# Patient Record
Sex: FEMALE | Race: WHITE | NOT HISPANIC OR LATINO
[De-identification: names, ages, dates, MRNs, and addresses within clinical notes are randomized per-mention and may not be internally consistent; named-entity substitution may affect disease eponyms.]

---

## 2022-01-31 ENCOUNTER — APPOINTMENT (OUTPATIENT)
Dept: INTERNAL MEDICINE | Facility: CLINIC | Age: 57
End: 2022-01-31
Payer: COMMERCIAL

## 2022-01-31 VITALS
WEIGHT: 192 LBS | HEART RATE: 105 BPM | RESPIRATION RATE: 17 BRPM | SYSTOLIC BLOOD PRESSURE: 130 MMHG | HEIGHT: 63 IN | TEMPERATURE: 96.4 F | DIASTOLIC BLOOD PRESSURE: 90 MMHG | OXYGEN SATURATION: 94 % | BODY MASS INDEX: 34.02 KG/M2

## 2022-01-31 PROCEDURE — 99214 OFFICE O/P EST MOD 30 MIN: CPT

## 2022-01-31 RX ORDER — CYCLOBENZAPRINE HYDROCHLORIDE 10 MG/1
10 TABLET, FILM COATED ORAL
Qty: 30 | Refills: 0 | Status: ACTIVE | COMMUNITY
Start: 2022-01-31 | End: 1900-01-01

## 2022-01-31 RX ORDER — LIDOCAINE 5% 700 MG/1
5 PATCH TOPICAL
Qty: 7 | Refills: 1 | Status: ACTIVE | COMMUNITY
Start: 2022-01-31 | End: 1900-01-01

## 2022-02-01 ENCOUNTER — TRANSCRIPTION ENCOUNTER (OUTPATIENT)
Age: 57
End: 2022-02-01

## 2022-02-01 NOTE — HISTORY OF PRESENT ILLNESS
[FreeTextEntry1] : cc/itchy rash chest wall, lbp with spasm and pain [de-identified] : Patient noted a rash under her breasts and upper abdomen which is itchy in nature times several days.  Patient states that cortisone cream made it worse irritated the rash.  Patient also has low back pain with spasm and decreased range of motion difficult to sit.

## 2022-02-01 NOTE — HEALTH RISK ASSESSMENT
[Fair] :  ~his/her~ mood as fair [Former] : Former [No falls in past year] : Patient reported no falls in the past year [None] : None [With Significant Other] : lives with significant other [Employed] : employed [High School] : high school [] :  [Fully functional (bathing, dressing, toileting, transferring, walking, feeding)] : Fully functional (bathing, dressing, toileting, transferring, walking, feeding) [Fully functional (using the telephone, shopping, preparing meals, housekeeping, doing laundry, using] : Fully functional and needs no help or supervision to perform IADLs (using the telephone, shopping, preparing meals, housekeeping, doing laundry, using transportation, managing medications and managing finances) [Change in mental status noted] : No change in mental status noted [Language] : denies difficulty with language [Behavior] : denies difficulty with behavior [Learning/Retaining New Information] : denies difficulty learning/retaining new information [Handling Complex Tasks] : denies difficulty handling complex tasks [Reasoning] : denies difficulty with reasoning [Spatial Ability and Orientation] : denies difficulty with spatial ability and orientation [MammogramComments] : Not up-to-date [PapSmearComments] : Not up-to-date [BoneDensityComments] : Not up-to-date [ColonoscopyComments] : Not up-to-date

## 2022-02-01 NOTE — REVIEW OF SYSTEMS
[Back Pain] : back pain [Skin Rash] : skin rash [Negative] : Neurological [de-identified] : Chest wall and under breasts erythematous rash

## 2022-02-02 ENCOUNTER — NON-APPOINTMENT (OUTPATIENT)
Age: 57
End: 2022-02-02

## 2022-02-08 RX ORDER — METHYLPREDNISOLONE 4 MG/1
4 TABLET ORAL
Qty: 1 | Refills: 0 | Status: ACTIVE | COMMUNITY
Start: 2022-02-08 | End: 1900-01-01

## 2022-02-18 DIAGNOSIS — J32.9 CHRONIC SINUSITIS, UNSPECIFIED: ICD-10-CM

## 2022-02-18 DIAGNOSIS — N39.0 URINARY TRACT INFECTION, SITE NOT SPECIFIED: ICD-10-CM

## 2022-02-18 DIAGNOSIS — F17.200 NICOTINE DEPENDENCE, UNSPECIFIED, UNCOMPLICATED: ICD-10-CM

## 2022-02-18 DIAGNOSIS — R05.9 COUGH, UNSPECIFIED: ICD-10-CM

## 2022-02-18 DIAGNOSIS — M25.579 PAIN IN UNSPECIFIED ANKLE AND JOINTS OF UNSPECIFIED FOOT: ICD-10-CM

## 2022-02-18 DIAGNOSIS — J06.9 ACUTE UPPER RESPIRATORY INFECTION, UNSPECIFIED: ICD-10-CM

## 2022-02-18 DIAGNOSIS — Q07.00 ARNOLD-CHIARI SYNDROME W/OUT SPINA BIFIDA OR HYDROCEPHALUS: ICD-10-CM

## 2022-02-19 ENCOUNTER — LABORATORY RESULT (OUTPATIENT)
Age: 57
End: 2022-02-19

## 2022-02-22 ENCOUNTER — NON-APPOINTMENT (OUTPATIENT)
Age: 57
End: 2022-02-22

## 2022-02-23 ENCOUNTER — APPOINTMENT (OUTPATIENT)
Dept: INTERNAL MEDICINE | Facility: CLINIC | Age: 57
End: 2022-02-23
Payer: COMMERCIAL

## 2022-02-23 ENCOUNTER — NON-APPOINTMENT (OUTPATIENT)
Age: 57
End: 2022-02-23

## 2022-02-23 VITALS
SYSTOLIC BLOOD PRESSURE: 136 MMHG | WEIGHT: 195 LBS | HEART RATE: 92 BPM | OXYGEN SATURATION: 99 % | DIASTOLIC BLOOD PRESSURE: 90 MMHG | TEMPERATURE: 99.7 F | RESPIRATION RATE: 17 BRPM | HEIGHT: 63 IN | BODY MASS INDEX: 34.55 KG/M2

## 2022-02-23 DIAGNOSIS — D75.1 SECONDARY POLYCYTHEMIA: ICD-10-CM

## 2022-02-23 DIAGNOSIS — M54.50 LOW BACK PAIN, UNSPECIFIED: ICD-10-CM

## 2022-02-23 DIAGNOSIS — Z86.39 PERSONAL HISTORY OF OTHER ENDOCRINE, NUTRITIONAL AND METABOLIC DISEASE: ICD-10-CM

## 2022-02-23 PROCEDURE — 93000 ELECTROCARDIOGRAM COMPLETE: CPT

## 2022-02-23 PROCEDURE — 99396 PREV VISIT EST AGE 40-64: CPT | Mod: 25

## 2022-02-23 PROCEDURE — 99214 OFFICE O/P EST MOD 30 MIN: CPT | Mod: 25

## 2022-02-23 PROCEDURE — 99406 BEHAV CHNG SMOKING 3-10 MIN: CPT | Mod: NC

## 2022-02-24 PROBLEM — D75.1 POLYCYTHEMIA: Status: ACTIVE | Noted: 2022-02-24

## 2022-02-24 PROBLEM — M54.50 LOW BACK PAIN: Status: ACTIVE | Noted: 2022-02-01

## 2022-02-24 PROBLEM — Z86.39 HISTORY OF HYPERLIPIDEMIA: Status: ACTIVE | Noted: 2022-02-01

## 2022-02-24 NOTE — PHYSICAL EXAM
[Declined Breast Exam] : declined breast exam  [Declined Rectal Exam] : declined rectal exam [Scoliosis] : scoliosis [Coordination Grossly Intact] : coordination grossly intact [Normal Gait] : normal gait [Deep Tendon Reflexes (DTR)] : deep tendon reflexes were 2+ and symmetric [Normal] : affect was normal and insight and judgment were intact [Kyphosis] : no kyphosis [de-identified] : Obesity [de-identified] : No CVA tenderness [de-identified] : Low back spasm, decreased range of motion tenderness to deep palpation [de-identified] : Left  straight leg raise positive

## 2022-02-24 NOTE — COUNSELING
[Fall prevention counseling provided] : Fall prevention counseling provided [Adequate lighting] : Adequate lighting [No throw rugs] : No throw rugs [Use proper foot wear] : Use proper foot wear [Use recommended devices] : Use recommended devices [Behavioral health counseling provided] : Behavioral health counseling provided [Sleep ___ hours/day] : Sleep [unfilled] hours/day [Engage in a relaxing activity] : Engage in a relaxing activity [Plan in advance] : Plan in advance [Cessation strategies including cessation program discussed] : Cessation strategies including cessation program discussed [Use of nicotine replacement therapies and other medications discussed] : Use of nicotine replacement therapies and other medications discussed [Encouraged to pick a quit date and identify support needed to quit] : Encouraged to pick a quit date and identify support needed to quit [Smoking Cessation Program Referral] : Smoking Cessation Program Referral  [Potential consequences of obesity discussed] : Potential consequences of obesity discussed [Benefits of weight loss discussed] : Benefits of weight loss discussed [Structured Weight Management Program suggested:] : Structured weight management program suggested [Encouraged to maintain food diary] : Encouraged to maintain food diary [Encouraged to increase physical activity] : Encouraged to increase physical activity [Encouraged to use exercise tracking device] : Encouraged to use exercise tracking device [Target Wt Loss Goal ___] : Weight Loss Goals: Target weight loss goal [unfilled] lbs [Weigh Self Weekly] : weigh self weekly [Decrease Portions] : decrease portions [____ min/wk Activity] : [unfilled] min/wk activity [Keep Food Diary] : keep food diary [None] : None [Good understanding] : Patient has a good understanding of lifestyle changes and steps needed to achieve self management goal [FreeTextEntry1] : 3 [de-identified] : Medical Annual wellness visit completed:\par HRA completed and reviewed with patient\par Medical, family, surgical history reviewed with patient and updated\par List of current providers r/w patient and updated\par Vitals, BMI reviewed and discussed along with healthy BMI goals. Dietary counseling x 15 minutes provided\par Depression PHQ 9 completed and reviewed \par Annual safety assessment reviewed\par discussed advanced directives\par smoking cessation counseling provided\par Established routine screening and immunization schedules\par Medical Annual wellness visit completed:\par HRA completed and reviewed with patient\par Medical, family, surgical history reviewed with patient and updated\par List of current providers r/w patient and updated\par Vitals, BMI reviewed and discussed along with healthy BMI goals. Dietary counseling x 15 minutes provided\par Depression PHQ 9 completed and reviewed \par Annual safety assessment reviewed\par discussed advanced directives\par smoking cessation counseling provided\par Established routine screening and immunization schedules\par \par VACCINATION & OTHER TX RECOMMENDATIONS\par \par ASA preventative therapy\par Calcium/Vitamin D supplementation\par  \par Dietary counseling, nutrition referral\par risks vs. benefits d/w patient. routine vaccination and vaccination schedules and recommendation d/w patient\par \par Vaccines recommended: \par * pneumovax (once after 65) & prevnar\par * annual Influenza vaccine\par * Hep B vaccines\par * zostavax\par * Tdap\par \par Colorectal screening recommended; screening colonoscopy q10yr, flex sig q5yr, annual fecal occult testing\par BMD recommended biennially for osteoporosis screening\par Glaucoma screening recommended, annual optho evals\par Cardiovascular screening and blood tests recommended and discussed w/ patient, cholesterol screening and dietary counseling\par AAA recommended x 1\par \par DIABETIC recommendations:\par med nutrition counseling, nutrition referral \par annual podiatry evaluations and follow up\par annual optho eval/retinal exams\par routine blood tets, including FBS, a1c% and cholesterol panels\par \par Symptomatic patients : Test for influenza, if positive, treat for influenza and do not continue below. \par 1. Fever plus cough or shortness of breath : Test for RVP and COVID-19.\par 2.Indirect, circumstantial or unclear exposure to COVID-19, or other concerning cases not meeting above criteria: Please call [facility] to discuss testing. \par +++ All above cases must be reported to the [facility] registry. +++\par \par Asymptomatic patients: \par 1. Known first-degree direct-contact exposure to positive COVID-19 patient but asymptomatic: No testing PLUS 14 day self-quarantine. Pt to call if symptoms develop. Report to [facility] Registry.\par 2. No known exposure and asymptomatic, referred from outside healthcare organization: Please call [facility] to discuss testing. \par 3.All other asymptomatic patients with no known exposures: no testing, no exceptions.\par \par

## 2022-02-24 NOTE — HISTORY OF PRESENT ILLNESS
[FreeTextEntry1] : 56-year-old female who was seen last month for severe low back pain radiating into lower extremities, rash on chest wall and abdomen. [de-identified] : Patient continues to have low back pain has been on Medrol Dosepak and Flexeril.  Has yet to see physiatry.  Patient admits that pain is constant and lower back and radiates down into buttocks and lower extremities.  Patient states that rash on chest and abdomen has resolved.  Patient had blood work which reveals abnormalities.

## 2022-02-24 NOTE — HEALTH RISK ASSESSMENT
[Fair] :  ~his/her~ mood as fair [Current] : Current [15-19] : 15-19 [Yes] : Yes [1 or 2 (0 pts)] : 1 or 2 (0 points) [Never (0 pts)] : Never (0 points) [No falls in past year] : Patient reported no falls in the past year [0] : 2) Feeling down, depressed, or hopeless: Not at all (0) [PHQ-2 Negative - No further assessment needed] : PHQ-2 Negative - No further assessment needed [None] : None [With Significant Other] : lives with significant other [Employed] : employed [High School] : high school [] :  [Feels Safe at Home] : Feels safe at home [Fully functional (bathing, dressing, toileting, transferring, walking, feeding)] : Fully functional (bathing, dressing, toileting, transferring, walking, feeding) [Fully functional (using the telephone, shopping, preparing meals, housekeeping, doing laundry, using] : Fully functional and needs no help or supervision to perform IADLs (using the telephone, shopping, preparing meals, housekeeping, doing laundry, using transportation, managing medications and managing finances) [Smoke Detector] : smoke detector [Carbon Monoxide Detector] : carbon monoxide detector [Seat Belt] :  uses seat belt [Designated Healthcare Proxy] : Designated healthcare proxy [Name: ___] : Health Care Proxy's Name: [unfilled]  [Relationship: ___] : Relationship: [unfilled] [Aggressive treatment] : aggressive treatment [I will adhere to the patient's wishes.] : I will adhere to the patient's wishes. [de-identified] : Light [de-identified] : Regular [Language] : denies difficulty with language [Behavior] : denies difficulty with behavior [Learning/Retaining New Information] : denies difficulty learning/retaining new information [Reasoning] : denies difficulty with reasoning [Reports changes in hearing] : Reports no changes in hearing [Reports changes in vision] : Reports no changes in vision [Reports changes in dental health] : Reports no changes in dental health [Travel to Developing Areas] : does not  travel to developing areas [Caregiver Concerns] : does not have caregiver concerns [MammogramComments] : Ordered [PapSmearComments] : Recommended [BoneDensityComments] : Ordered

## 2022-02-24 NOTE — REVIEW OF SYSTEMS
[Skin Rash] : skin rash [Negative] : Heme/Lymph [FreeTextEntry2] : Weight gain excessive appetite since pandemic [FreeTextEntry9] : Low back pain [de-identified] : Abdomen chest resolved

## 2022-02-24 NOTE — REASON FOR VISIT
[Annual Wellness Visit] : an annual wellness visit [FreeTextEntry1] : Annual wellness exam for this 56-year-old female

## 2022-04-16 DIAGNOSIS — Z00.00 ENCOUNTER FOR GENERAL ADULT MEDICAL EXAMINATION W/OUT ABNORMAL FINDINGS: ICD-10-CM

## 2022-04-22 ENCOUNTER — LABORATORY RESULT (OUTPATIENT)
Age: 57
End: 2022-04-22

## 2022-04-27 ENCOUNTER — NON-APPOINTMENT (OUTPATIENT)
Age: 57
End: 2022-04-27

## 2022-05-04 ENCOUNTER — APPOINTMENT (OUTPATIENT)
Dept: INTERNAL MEDICINE | Facility: CLINIC | Age: 57
End: 2022-05-04
Payer: COMMERCIAL

## 2022-05-04 VITALS
OXYGEN SATURATION: 95 % | RESPIRATION RATE: 18 BRPM | BODY MASS INDEX: 34.38 KG/M2 | HEIGHT: 63 IN | TEMPERATURE: 97.3 F | WEIGHT: 194 LBS | DIASTOLIC BLOOD PRESSURE: 88 MMHG | SYSTOLIC BLOOD PRESSURE: 152 MMHG | HEART RATE: 88 BPM

## 2022-05-04 DIAGNOSIS — E11.9 TYPE 2 DIABETES MELLITUS W/OUT COMPLICATIONS: ICD-10-CM

## 2022-05-04 DIAGNOSIS — F17.210 NICOTINE DEPENDENCE, CIGARETTES, UNCOMPLICATED: ICD-10-CM

## 2022-05-04 DIAGNOSIS — R03.0 ELEVATED BLOOD-PRESSURE READING, W/OUT DIAGNOSIS OF HYPERTENSION: ICD-10-CM

## 2022-05-04 DIAGNOSIS — B35.4 TINEA CORPORIS: ICD-10-CM

## 2022-05-04 DIAGNOSIS — E66.9 OBESITY, UNSPECIFIED: ICD-10-CM

## 2022-05-04 PROCEDURE — 99401 PREV MED CNSL INDIV APPRX 15: CPT

## 2022-05-04 PROCEDURE — 99213 OFFICE O/P EST LOW 20 MIN: CPT | Mod: 25

## 2022-05-04 RX ORDER — NYSTATIN AND TRIAMCINOLONE ACETONIDE 100000; 1 MG/G; MG/G
100000-0.1 CREAM TOPICAL
Qty: 60 | Refills: 0 | Status: ACTIVE | COMMUNITY
Start: 2022-01-31 | End: 1900-01-01

## 2022-05-04 RX ORDER — FLUCONAZOLE 100 MG/1
100 TABLET ORAL DAILY
Qty: 10 | Refills: 0 | Status: ACTIVE | COMMUNITY
Start: 2022-01-31 | End: 1900-01-01

## 2022-05-04 RX ORDER — KETOCONAZOLE 1 %
1 SHAMPOO TOPICAL
Qty: 1 | Refills: 1 | Status: ACTIVE | COMMUNITY
Start: 2022-05-04 | End: 1900-01-01

## 2022-05-04 RX ORDER — ROSUVASTATIN CALCIUM 10 MG/1
10 TABLET, FILM COATED ORAL
Qty: 90 | Refills: 2 | Status: ACTIVE | COMMUNITY
Start: 2022-02-23 | End: 1900-01-01

## 2022-05-05 PROBLEM — E66.9 MILD OBESITY: Status: ACTIVE | Noted: 2022-02-01

## 2022-05-05 PROBLEM — F17.210 HEAVY CIGARETTE SMOKER: Status: ACTIVE | Noted: 2022-02-24

## 2022-05-05 PROBLEM — E11.9 TYPE 2 DIABETES MELLITUS: Status: ACTIVE | Noted: 2022-02-24

## 2022-05-05 PROBLEM — B35.4 TINEA CORPORIS: Status: ACTIVE | Noted: 2022-02-01

## 2022-05-05 PROBLEM — R03.0 ELEVATED BLOOD PRESSURE READING WITHOUT DIAGNOSIS OF HYPERTENSION: Status: ACTIVE | Noted: 2022-02-01

## 2022-05-05 NOTE — PHYSICAL EXAM
[Declined Breast Exam] : declined breast exam  [Declined Rectal Exam] : declined rectal exam [Scoliosis] : scoliosis [Coordination Grossly Intact] : coordination grossly intact [Normal Gait] : normal gait [Deep Tendon Reflexes (DTR)] : deep tendon reflexes were 2+ and symmetric [Normal] : affect was normal and insight and judgment were intact [Kyphosis] : no kyphosis [de-identified] : Obesity [de-identified] : No CVA tenderness [de-identified] : Low back spasm, decreased range of motion tenderness to deep palpation is mild [de-identified] : Low back pain improved [de-identified] : Left  straight leg raise positive

## 2022-05-05 NOTE — HEALTH RISK ASSESSMENT
[Fair] :  ~his/her~ mood as fair [Current] : Current [15-19] : 15-19 [Yes] : Yes [1 or 2 (0 pts)] : 1 or 2 (0 points) [Never (0 pts)] : Never (0 points) [No falls in past year] : Patient reported no falls in the past year [0] : 2) Feeling down, depressed, or hopeless: Not at all (0) [PHQ-2 Negative - No further assessment needed] : PHQ-2 Negative - No further assessment needed [None] : None [With Significant Other] : lives with significant other [Employed] : employed [High School] : high school [] :  [Feels Safe at Home] : Feels safe at home [Fully functional (bathing, dressing, toileting, transferring, walking, feeding)] : Fully functional (bathing, dressing, toileting, transferring, walking, feeding) [Fully functional (using the telephone, shopping, preparing meals, housekeeping, doing laundry, using] : Fully functional and needs no help or supervision to perform IADLs (using the telephone, shopping, preparing meals, housekeeping, doing laundry, using transportation, managing medications and managing finances) [Smoke Detector] : smoke detector [Carbon Monoxide Detector] : carbon monoxide detector [Seat Belt] :  uses seat belt [Designated Healthcare Proxy] : Designated healthcare proxy [Name: ___] : Health Care Proxy's Name: [unfilled]  [Relationship: ___] : Relationship: [unfilled] [Aggressive treatment] : aggressive treatment [I will adhere to the patient's wishes.] : I will adhere to the patient's wishes. [de-identified] : Light [de-identified] : Regular [Language] : denies difficulty with language [Behavior] : denies difficulty with behavior [Learning/Retaining New Information] : denies difficulty learning/retaining new information [Reasoning] : denies difficulty with reasoning [Reports changes in hearing] : Reports no changes in hearing [Reports changes in vision] : Reports no changes in vision [Reports changes in dental health] : Reports no changes in dental health [Travel to Developing Areas] : does not  travel to developing areas [Caregiver Concerns] : does not have caregiver concerns [MammogramComments] : Ordered [PapSmearComments] : Recommended [BoneDensityComments] : Ordered

## 2022-05-05 NOTE — COUNSELING
[Fall prevention counseling provided] : Fall prevention counseling provided [Adequate lighting] : Adequate lighting [No throw rugs] : No throw rugs [Use proper foot wear] : Use proper foot wear [Use recommended devices] : Use recommended devices [Behavioral health counseling provided] : Behavioral health counseling provided [Sleep ___ hours/day] : Sleep [unfilled] hours/day [Engage in a relaxing activity] : Engage in a relaxing activity [Plan in advance] : Plan in advance [Cessation strategies including cessation program discussed] : Cessation strategies including cessation program discussed [Use of nicotine replacement therapies and other medications discussed] : Use of nicotine replacement therapies and other medications discussed [Encouraged to pick a quit date and identify support needed to quit] : Encouraged to pick a quit date and identify support needed to quit [Smoking Cessation Program Referral] : Smoking Cessation Program Referral  [Potential consequences of obesity discussed] : Potential consequences of obesity discussed [Benefits of weight loss discussed] : Benefits of weight loss discussed [Structured Weight Management Program suggested:] : Structured weight management program suggested [Encouraged to maintain food diary] : Encouraged to maintain food diary [Encouraged to increase physical activity] : Encouraged to increase physical activity [Encouraged to use exercise tracking device] : Encouraged to use exercise tracking device [Target Wt Loss Goal ___] : Weight Loss Goals: Target weight loss goal [unfilled] lbs [Weigh Self Weekly] : weigh self weekly [Decrease Portions] : decrease portions [____ min/wk Activity] : [unfilled] min/wk activity [Keep Food Diary] : keep food diary [None] : None [Good understanding] : Patient has a good understanding of lifestyle changes and steps needed to achieve self management goal [FreeTextEntry1] : 3 [de-identified] :  Dscd.D/E wt.loss needs to loose 10% TBW.DSCD.self monitoring, goal setting,problem solving w-b mod.portion control, avoidence of skipping meals, high glycemic foods,snacking and mindless eating in front of the tv.Suggested use of small plates and not keepingall of the food on the dinner table and leaving it at the stove top.Pt was also told to calorie count and an soraya was recommended DSCD exercising 20 minutes per day:dscd:med /pharmaco bariatric tx options.\par \par  - Encouraged a low fat/low cholesterol diet\par  - Discussed Healthy eating, avoidance of concentrated sweets, and to include vegetables by at least 3 meals a day\par  - encouraged low glycemic fruits, grains and vegetables and a diet high in plant protein\par  - Discussed regular exercise\par  - Discussed importance of follow up physician visits\par \par - Discussed diabetes physiology\par - Discussed importance of monitoring blood glucose levels\par - Encouraged a low fat/low cholesterol diet\par - Discussed symptoms of hyperglycemia and hypoglycemia\par - Discussed ADA glucose goals\par - Discussed  HGB A1c and the effects of blood glucose on the level\par - Discussed Healthy eating, avoidance of concentrated sweets, and to include vegetables by at least 2 meals a day\par - Discussed regular exercise\par - Discussed importance of follow up physician visits\par \par \par \par Bp stable, continue with medications, dash diet, exercise, and dietary management.Continue to check home Bp’s.\par \par low chol diet. Avoid fried foods, red meat, butter, eggs, hard cheeses. Use canola or olive oil preferred.\par \par  - Encouraged a low fat/low cholesterol diet\par  - Discussed Healthy eating, avoidance of concentrated sweets, and to include vegetables by at least 3 meals a day\par  - encouraged low glycemic fruits, grains and vegetables and a diet high in plant protein\par  - Discussed regular exercise\par  - Discussed importance of follow up physician visits\par \par Symptomatic patients : Test for influenza, if positive, treat for influenza and do not continue below. \par 1. Fever plus cough or shortness of breath : Test for RVP and COVID-19.\par 2.Indirect, circumstantial or unclear exposure to COVID-19, or other concerning cases not meeting above criteria: Please call [facility] to discuss testing. \par +++ All above cases must be reported to the [facility] registry. +++\par \par Asymptomatic patients: \par 1. Known first-degree direct-contact exposure to positive COVID-19 patient but asymptomatic: No testing PLUS 14 day self-quarantine. Pt to call if symptoms develop. Report to [facility] Registry.\par 2. No known exposure and asymptomatic, referred from outside healthcare organization: Please call [facility] to discuss testing. \par 3.All other asymptomatic patients with no known exposures: no testing, no exceptions.\par Face-to-face counseling 15 minutes\par

## 2022-05-05 NOTE — REVIEW OF SYSTEMS
[Skin Rash] : skin rash [Negative] : Heme/Lymph [FreeTextEntry2] : Weight gain excessive appetite since pandemic [FreeTextEntry9] : Low back pain [de-identified] : Abdomen chest resolved

## 2022-05-05 NOTE — HISTORY OF PRESENT ILLNESS
[FreeTextEntry1] : 56-year-old female who was seen to  months ago for severe low back pain radiating into lower extremities, rash on chest wall and abdomen.  Patient was found to have hyperlipidemia and diabetes patient also has a history of obesity= [de-identified] :  Patient states that her low back pain persists but is much improved is continuing with physical therapy.  Patient was started on a statin will need follow-up blood work patient has a history of obesity,And type 2 diabetes

## 2022-05-05 NOTE — DATA REVIEWED
[FreeTextEntry1] : Hematocrit 49, A1c 7.1 average, glucose 157 cholesterol 186 LDL  117, vitamin D 15.9

## 2022-05-13 ENCOUNTER — APPOINTMENT (OUTPATIENT)
Dept: ORTHOPEDIC SURGERY | Facility: CLINIC | Age: 57
End: 2022-05-13

## 2023-11-03 ENCOUNTER — APPOINTMENT (OUTPATIENT)
Dept: ORTHOPEDIC SURGERY | Facility: CLINIC | Age: 58
End: 2023-11-03
Payer: OTHER MISCELLANEOUS

## 2023-11-03 VITALS — HEIGHT: 63 IN | BODY MASS INDEX: 33.66 KG/M2 | WEIGHT: 190 LBS

## 2023-11-03 DIAGNOSIS — S33.5XXA SPRAIN OF LIGAMENTS OF LUMBAR SPINE, INITIAL ENCOUNTER: ICD-10-CM

## 2023-11-03 DIAGNOSIS — S83.92XA SPRAIN OF UNSPECIFIED SITE OF LEFT KNEE, INITIAL ENCOUNTER: ICD-10-CM

## 2023-11-03 DIAGNOSIS — F17.200 NICOTINE DEPENDENCE, UNSPECIFIED, UNCOMPLICATED: ICD-10-CM

## 2023-11-03 DIAGNOSIS — S80.02XA CONTUSION OF LEFT KNEE, INITIAL ENCOUNTER: ICD-10-CM

## 2023-11-03 DIAGNOSIS — Z78.9 OTHER SPECIFIED HEALTH STATUS: ICD-10-CM

## 2023-11-03 PROCEDURE — 72100 X-RAY EXAM L-S SPINE 2/3 VWS: CPT

## 2023-11-03 PROCEDURE — 73030 X-RAY EXAM OF SHOULDER: CPT | Mod: LT

## 2023-11-03 PROCEDURE — L1833: CPT | Mod: LT

## 2023-11-03 PROCEDURE — 72170 X-RAY EXAM OF PELVIS: CPT

## 2023-11-03 PROCEDURE — 99204 OFFICE O/P NEW MOD 45 MIN: CPT

## 2023-11-03 PROCEDURE — 73564 X-RAY EXAM KNEE 4 OR MORE: CPT | Mod: LT

## 2023-11-03 RX ORDER — CYCLOBENZAPRINE HYDROCHLORIDE 5 MG/1
5 TABLET, FILM COATED ORAL 3 TIMES DAILY
Qty: 90 | Refills: 0 | Status: ACTIVE | COMMUNITY
Start: 2023-11-03 | End: 1900-01-01

## 2023-11-03 RX ORDER — MELOXICAM 15 MG/1
15 TABLET ORAL
Qty: 30 | Refills: 0 | Status: ACTIVE | COMMUNITY
Start: 2023-11-03 | End: 1900-01-01

## 2023-11-10 ENCOUNTER — APPOINTMENT (OUTPATIENT)
Dept: MRI IMAGING | Facility: CLINIC | Age: 58
End: 2023-11-10

## 2023-11-11 ENCOUNTER — APPOINTMENT (OUTPATIENT)
Dept: MRI IMAGING | Facility: CLINIC | Age: 58
End: 2023-11-11
Payer: OTHER MISCELLANEOUS

## 2023-11-11 PROCEDURE — 73721 MRI JNT OF LWR EXTRE W/O DYE: CPT | Mod: LT

## 2023-11-11 PROCEDURE — 73221 MRI JOINT UPR EXTREM W/O DYE: CPT | Mod: LT

## 2023-11-16 ENCOUNTER — NON-APPOINTMENT (OUTPATIENT)
Age: 58
End: 2023-11-16

## 2023-11-17 ENCOUNTER — APPOINTMENT (OUTPATIENT)
Dept: ORTHOPEDIC SURGERY | Facility: CLINIC | Age: 58
End: 2023-11-17

## 2023-11-17 ENCOUNTER — APPOINTMENT (OUTPATIENT)
Dept: ORTHOPEDIC SURGERY | Facility: CLINIC | Age: 58
End: 2023-11-17
Payer: OTHER MISCELLANEOUS

## 2023-11-17 VITALS — WEIGHT: 190 LBS | BODY MASS INDEX: 33.66 KG/M2 | HEIGHT: 63 IN

## 2023-11-17 PROCEDURE — J3490M: CUSTOM

## 2023-11-17 PROCEDURE — 99214 OFFICE O/P EST MOD 30 MIN: CPT | Mod: 25

## 2023-11-17 PROCEDURE — 20611 DRAIN/INJ JOINT/BURSA W/US: CPT | Mod: 50

## 2023-12-15 ENCOUNTER — APPOINTMENT (OUTPATIENT)
Dept: ORTHOPEDIC SURGERY | Facility: CLINIC | Age: 58
End: 2023-12-15
Payer: OTHER MISCELLANEOUS

## 2023-12-15 ENCOUNTER — NON-APPOINTMENT (OUTPATIENT)
Age: 58
End: 2023-12-15

## 2023-12-15 VITALS — WEIGHT: 190 LBS | HEIGHT: 63 IN | BODY MASS INDEX: 33.66 KG/M2

## 2023-12-15 DIAGNOSIS — M17.12 UNILATERAL PRIMARY OSTEOARTHRITIS, LEFT KNEE: ICD-10-CM

## 2023-12-15 PROCEDURE — 99214 OFFICE O/P EST MOD 30 MIN: CPT

## 2023-12-15 NOTE — PROCEDURE
[Large Joint Injection] : Large joint injection [Left] : of the left [Shoulder] : shoulder [Pain] : pain [Inflammation] : inflammation [Alcohol] : alcohol [Betadine] : betadine [Ethyl Chloride sprayed topically] : ethyl chloride sprayed topically [Sterile technique used] : sterile technique used [___ cc    3mg] :  Betamethasone (Celestone) ~Vcc of 3mg [___ cc    1%] : Lidocaine ~Vcc of 1%  [___ cc    0.25%] : Bupivacaine (Marcaine) ~Vcc of 0.25%  [] : Patient tolerated procedure well [Call if redness, pain or fever occur] : call if redness, pain or fever occur [Apply ice for 15min out of every hour for the next 12-24 hours as tolerated] : apply ice for 15 minutes out of every hour for the next 12-24 hours as tolerated [Previous OTC use and PT nontherapeutic] : patient has tried OTC's including aspirin, Ibuprofen, Aleve, etc or prescription NSAIDS, and/or exercises at home and/or physical therapy without satisfactory response [Patient had decreased mobility in the joint] : patient had decreased mobility in the joint [Risks, benefits, alternatives discussed / Verbal consent obtained] : the risks benefits, and alternatives have been discussed, and verbal consent was obtained [Prior failure or difficult injection] : prior failure or difficult injection [All ultrasound images have been permanently captured and stored accordingly in our picture archiving and communication system] : All ultrasound images have been permanently captured and stored accordingly in our picture archiving and communication system [Visualization of the needle and placement of injection was performed without complication] : visualization of the needle and placement of injection was performed without complication

## 2023-12-16 NOTE — DISCUSSION/SUMMARY
[de-identified] : General Dx Discussion The patient was advised of the diagnosis. The natural history of the pathology was explained in full to the patient in layman's terms. All questions were answered. The risks and benefits of surgical and non-surgical treatment alternatives were explained in full to the patient.  Case Discussed again with her and her . Will start PT  will see her spine doctor for her neck and back  no work  Surg. options discussed  MRI reviewed, has with pain and loss of motion  Arthroscopy with decompression, debridement RCR, poss biceps tenodesis vs tenotomy discussed Risks, benefits and alternatives discussed. Risks include, but are not limited to infection, blood clot, nerve damage, recurrent tear, loss of motion, continued pain, worsened pain, need for another surgery in the future. Recurrent tear discussed as welll as RCT not being able to completely repairable.  Discussed that the surgery will not address any pain that she has from any OA she may have. She understands she will not be 100% better after surgery. Prolonged immobilization and rehabilitation discussed. Work limitations discussed. Questions answered. She expressed understanding and would like to proceed.  The patient was advised of the diagnosis.  The natural history of the pathology was explained in full to the patient in layman's terms. All questions were answered.  The risks and benefits of surgical and non-surgical treatment alternatives were explained in full to the patient.   The patient demonstrated a full understanding of the surgical and non-surgical options.  The risks of surgery were outlined in full to the patient including but not limited to bleeding, scarring, infection, sepsis, neurologic injury, vascular injury, failure to resolve symptoms, symptom recurrence, the need for further surgery, non-healing, wound breakdown, deep vein thrombosis, pulmonary embolism, spontaneous osteonecrosis, anesthesia complications and even death.  The patient understood all the risks and accepted them and understood that other complications could occur that are not mentioned above.  The intraoperative plan, post-operative plan, post-operative expectations and limitations were explained in full.  Expectations from non-surgical treatment were explained in full as well.  The patient demonstrated a complete understanding of the treatment alternatives and requested the above-mentioned procedure.  This will be scheduled accordingly.

## 2023-12-16 NOTE — DATA REVIEWED
[MRI] : MRI [Left] : left [Shoulder] : shoulder [Knee] : knee [Report was reviewed and noted in the chart] : The report was reviewed and noted in the chart [I reviewed the films/CD] : I reviewed the films/CD [FreeTextEntry1] : FT RCT with retraction left shoulder   synovitis OA knee
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
## 2023-12-16 NOTE — PHYSICAL EXAM
[Sitting] : sitting [Moderate] : moderate [5 ___] : forward flexion 5[unfilled]/5 [4___] : external rotation 4[unfilled]/5 [Left] : left knee [5___] : hamstring 5[unfilled]/5 [Positive] : positive Lilia [TWNoteComboBox6] : internal rotation L4 [de-identified] : external rotation 55 degrees [] : no pain with valgus stress [TWNoteComboBox7] : flexion 100 degrees [de-identified] : extension 0 degrees

## 2024-01-08 ENCOUNTER — APPOINTMENT (OUTPATIENT)
Dept: ORTHOPEDIC SURGERY | Facility: CLINIC | Age: 59
End: 2024-01-08

## 2024-01-19 ENCOUNTER — APPOINTMENT (OUTPATIENT)
Dept: ORTHOPEDIC SURGERY | Facility: CLINIC | Age: 59
End: 2024-01-19
Payer: OTHER MISCELLANEOUS

## 2024-01-19 ENCOUNTER — NON-APPOINTMENT (OUTPATIENT)
Age: 59
End: 2024-01-19

## 2024-01-19 VITALS — HEIGHT: 63 IN | WEIGHT: 190 LBS | BODY MASS INDEX: 33.66 KG/M2

## 2024-01-19 DIAGNOSIS — M77.8 OTHER ENTHESOPATHIES, NOT ELSEWHERE CLASSIFIED: ICD-10-CM

## 2024-01-19 DIAGNOSIS — S80.02XD CONTUSION OF LEFT KNEE, SUBSEQUENT ENCOUNTER: ICD-10-CM

## 2024-01-19 DIAGNOSIS — S33.5XXD SPRAIN OF LIGAMENTS OF LUMBAR SPINE, SUBSEQUENT ENCOUNTER: ICD-10-CM

## 2024-01-19 PROCEDURE — 99214 OFFICE O/P EST MOD 30 MIN: CPT

## 2024-01-19 NOTE — PHYSICAL EXAM
[Sitting] : sitting [Moderate] : moderate [5 ___] : forward flexion 5[unfilled]/5 [4___] : external rotation 4[unfilled]/5 [Left] : left knee [5___] : hamstring 5[unfilled]/5 [Positive] : positive Lilia [TWNoteComboBox6] : internal rotation L4 [de-identified] : external rotation 55 degrees [] : no pain with valgus stress [TWNoteComboBox7] : flexion 120 degrees [de-identified] : extension 0 degrees

## 2024-01-19 NOTE — DISCUSSION/SUMMARY
[de-identified] : General Dx Discussion The patient was advised of the diagnosis. The natural history of the pathology was explained in full to the patient in layman's terms. All questions were answered. The risks and benefits of surgical and non-surgical treatment alternatives were explained in full to the patient.  Case Discussed again with her and her . Will re-start PT  will see her spine doctor for her neck and back  no work  Surg. options discussed  Arthroscopy with decompression, debridement RCR, poss biceps tenodesis vs tenotomy discussed Post operative course again reviewed. Discussed that the surgery will not address any pain that she has from any OA she may have. She understands she will not be 100% better after surgery. Prolonged immobilization and rehabilitation discussed. Work limitations discussed. Questions answered. She expressed understanding and would like to proceed.  The patient was advised of the diagnosis.  The natural history of the pathology was explained in full to the patient in layman's terms. All questions were answered.  The risks and benefits of surgical and non-surgical treatment alternatives were explained in full to the patient.   The patient demonstrated a full understanding of the surgical and non-surgical options.  The risks of surgery were outlined in full to the patient including but not limited to bleeding, scarring, infection, sepsis, neurologic injury, vascular injury, failure to resolve symptoms, symptom recurrence, the need for further surgery, non-healing, wound breakdown, deep vein thrombosis, pulmonary embolism, spontaneous osteonecrosis, anesthesia complications and even death.  The patient understood all the risks and accepted them and understood that other complications could occur that are not mentioned above.  The intraoperative plan, post-operative plan, post-operative expectations and limitations were explained in full.  Expectations from non-surgical treatment were explained in full as well.  The patient demonstrated a complete understanding of the treatment alternatives and requested the above-mentioned procedure.  This will be scheduled accordingly.
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
## 2024-01-31 ENCOUNTER — APPOINTMENT (OUTPATIENT)
Age: 59
End: 2024-01-31
Payer: OTHER MISCELLANEOUS

## 2024-01-31 PROCEDURE — 29826 SHO ARTHRS SRG DECOMPRESSION: CPT | Mod: AS,LT

## 2024-01-31 PROCEDURE — 29826 SHO ARTHRS SRG DECOMPRESSION: CPT | Mod: LT

## 2024-01-31 PROCEDURE — 29823 SHO ARTHRS SRG XTNSV DBRDMT: CPT | Mod: 59,LT

## 2024-01-31 PROCEDURE — 29827 SHO ARTHRS SRG RT8TR CUF RPR: CPT | Mod: AS,LT

## 2024-01-31 PROCEDURE — 29820 SHO ARTHRS SRG PRTL SYNVCT: CPT | Mod: 59,LT

## 2024-01-31 PROCEDURE — 29823 SHO ARTHRS SRG XTNSV DBRDMT: CPT | Mod: AS,59,LT

## 2024-01-31 PROCEDURE — 29827 SHO ARTHRS SRG RT8TR CUF RPR: CPT | Mod: LT

## 2024-01-31 PROCEDURE — 29820 SHO ARTHRS SRG PRTL SYNVCT: CPT | Mod: AS,59,LT

## 2024-01-31 RX ORDER — ONDANSETRON 4 MG/1
4 TABLET ORAL EVERY 8 HOURS
Qty: 15 | Refills: 0 | Status: COMPLETED | COMMUNITY
Start: 2024-01-31 | End: 2024-02-05

## 2024-01-31 RX ORDER — OXYCODONE AND ACETAMINOPHEN 5; 325 MG/1; MG/1
5-325 TABLET ORAL
Qty: 30 | Refills: 0 | Status: COMPLETED | COMMUNITY
Start: 2024-01-31 | End: 2024-02-05

## 2024-02-08 ENCOUNTER — NON-APPOINTMENT (OUTPATIENT)
Age: 59
End: 2024-02-08

## 2024-02-08 ENCOUNTER — APPOINTMENT (OUTPATIENT)
Dept: ORTHOPEDIC SURGERY | Facility: CLINIC | Age: 59
End: 2024-02-08
Payer: OTHER MISCELLANEOUS

## 2024-02-08 VITALS — BODY MASS INDEX: 33.66 KG/M2 | WEIGHT: 190 LBS | HEIGHT: 63 IN

## 2024-02-08 PROCEDURE — 99024 POSTOP FOLLOW-UP VISIT: CPT

## 2024-02-08 NOTE — HISTORY OF PRESENT ILLNESS
[3] : 3 [de-identified] :  10/30/23 Here for f/u left shoulder s/p arthroscopy, decompression, debridement and rcr on 1/31/24. She is wearing the immobilizer and doing well. She c/o mild pain.  [FreeTextEntry1] : left shoulder [FreeTextEntry6] : soreness

## 2024-02-08 NOTE — WORK
[Total (100%)] : total (100%) [Does not reveal pre-existing condition(s) that may affect treatment/prognosis] : does not reveal pre-existing condition(s) that may affect treatment/prognosis [Cannot return to work because ________] : cannot return to work because [unfilled] [FreeTextEntry1] : good

## 2024-02-08 NOTE — DISCUSSION/SUMMARY
[de-identified] : General Dx Discussion The patient was advised of the diagnosis. The natural history of the pathology was explained in full to the patient in layman's terms. All questions were answered. The risks and benefits of surgical and non-surgical treatment alternatives were explained in full to the patient.  Case Discussed. Surgical Pictures reviewed. Continue in immobilizer. Will start a course of PT for Passive rom only.  No work.  f/u 2 weeks.     Entered by Darline COLBERT acting as a scribe. Instructions: Dr. Galicia- The documentation recorded by the scribe accurately reflects the service I personally performed and the decisions made by me.

## 2024-02-22 ENCOUNTER — NON-APPOINTMENT (OUTPATIENT)
Age: 59
End: 2024-02-22

## 2024-02-22 ENCOUNTER — APPOINTMENT (OUTPATIENT)
Dept: ORTHOPEDIC SURGERY | Facility: CLINIC | Age: 59
End: 2024-02-22
Payer: OTHER MISCELLANEOUS

## 2024-02-22 VITALS — BODY MASS INDEX: 33.66 KG/M2 | WEIGHT: 190 LBS | HEIGHT: 63 IN

## 2024-02-22 PROCEDURE — 99024 POSTOP FOLLOW-UP VISIT: CPT

## 2024-02-22 PROCEDURE — L3660: CPT

## 2024-02-22 NOTE — DISCUSSION/SUMMARY
[de-identified] : General Dx Discussion The patient was advised of the diagnosis. The natural history of the pathology was explained in full to the patient in layman's terms. All questions were answered. The risks and benefits of surgical and non-surgical treatment alternatives were explained in full to the patient.  Case discussed. Continue immobilizer. Continue PT for PROM. Follow up in 2 weeks.   Entered by FILEMON Rodriguez acting as scribe. - The documentation recorded by the scribe accurately reflects the service I personally performed and the decisions made by me.

## 2024-02-22 NOTE — HISTORY OF PRESENT ILLNESS
[Work related] : work related [2] : 2 [5] : 5 [Intermittent] : intermittent [Rest] : rest [Ice] : ice [Not working due to injury] : Work status: not working due to injury [] : yes [de-identified] : Post op left shoulder, DOS 1/31/24. [FreeTextEntry1] : Left shoulder [FreeTextEntry3] : 10/30/23 [FreeTextEntry6] : soreness [FreeTextEntry7] : to her bicep [FreeTextEntry9] : Tylenol [de-identified] : Physical therapy 2 x a week, HEP

## 2024-02-23 ENCOUNTER — APPOINTMENT (OUTPATIENT)
Dept: ORTHOPEDIC SURGERY | Facility: CLINIC | Age: 59
End: 2024-02-23

## 2024-03-07 ENCOUNTER — APPOINTMENT (OUTPATIENT)
Dept: ORTHOPEDIC SURGERY | Facility: CLINIC | Age: 59
End: 2024-03-07
Payer: OTHER MISCELLANEOUS

## 2024-03-07 VITALS — WEIGHT: 190 LBS | HEIGHT: 63 IN | BODY MASS INDEX: 33.66 KG/M2

## 2024-03-07 PROCEDURE — 99024 POSTOP FOLLOW-UP VISIT: CPT

## 2024-03-07 NOTE — HISTORY OF PRESENT ILLNESS
[Work related] : work related [3] : 3 [2] : 2 [Frequent] : frequent [Household chores] : household chores [Sleep] : sleep [Leisure] : leisure [Not working due to injury] : Work status: not working due to injury [] : yes [de-identified] : WC DOI 10/30/2023: Here for f/u left shoulder s/p arthroscopy, rcr on 1/31/24. She is going to PT and doing well. Wearing immobilizer.  [FreeTextEntry3] : 10/30/23 [FreeTextEntry1] : Left shoulder [de-identified] : Stretching [FreeTextEntry7] : to the bicep [FreeTextEntry6] : soreness [de-identified] : Physical therapy 2 x a week, HEP

## 2024-03-07 NOTE — DISCUSSION/SUMMARY
[de-identified] : General Dx Discussion The patient was advised of the diagnosis. The natural history of the pathology was explained in full to the patient in layman's terms. All questions were answered. The risks and benefits of surgical and non-surgical treatment alternatives were explained in full to the patient.  Case Discussed. Advised she can d/c sling.  Advance PT to AROM. No Lifting. f/u 4 weeks.   Entered by Darline COLBERT acting as a scribe. Instructions: Dr. Galicia- The documentation recorded by the scribe accurately reflects the service I personally performed and the decisions made by me.

## 2024-04-04 ENCOUNTER — NON-APPOINTMENT (OUTPATIENT)
Age: 59
End: 2024-04-04

## 2024-04-04 ENCOUNTER — APPOINTMENT (OUTPATIENT)
Dept: ORTHOPEDIC SURGERY | Facility: CLINIC | Age: 59
End: 2024-04-04
Payer: OTHER MISCELLANEOUS

## 2024-04-04 VITALS — HEIGHT: 63 IN | BODY MASS INDEX: 33.66 KG/M2 | WEIGHT: 190 LBS

## 2024-04-04 PROCEDURE — 99024 POSTOP FOLLOW-UP VISIT: CPT

## 2024-04-04 NOTE — PHYSICAL EXAM
[Left] : left shoulder [Sitting] : sitting [Mild] : mild [] : strength is improving [TWNoteComboBox7] : active forward flexion 120 degrees [TWNoteComboBox6] : internal rotation lateral hip

## 2024-04-04 NOTE — DISCUSSION/SUMMARY
[de-identified] : General Dx Discussion The patient was advised of the diagnosis. The natural history of the pathology was explained in full to the patient in layman's terms. All questions were answered. The risks and benefits of surgical and non-surgical treatment alternatives were explained in full to the patient.  Case Discussed. Doing well.  Continue PT. No Lifting.  No work at this time.   Entered by Darline COLBERT acting as a scribe. Instructions: Dr. Galicia- The documentation recorded by the scribe accurately reflects the service I personally performed and the decisions made by me.

## 2024-04-04 NOTE — HISTORY OF PRESENT ILLNESS
[Work related] : work related [2] : 2 [Frequent] : frequent [Household chores] : household chores [Leisure] : leisure [Sleep] : sleep [Not working due to injury] : Work status: not working due to injury [] : yes [4] : 4 [de-identified] :  DOI 10/30/2023: Here for f/u left shoulder s/p arthroscopy, rcr on 1/31/24. She is going to PT and doing well. Still having some pain.  [FreeTextEntry1] : Left shoulder [FreeTextEntry3] : 10/30/23 [FreeTextEntry6] : soreness [FreeTextEntry7] : to the bicep [de-identified] : Stretching [de-identified] : Physical therapy 2 x a week, HEP

## 2024-05-02 ENCOUNTER — NON-APPOINTMENT (OUTPATIENT)
Age: 59
End: 2024-05-02

## 2024-05-02 ENCOUNTER — APPOINTMENT (OUTPATIENT)
Dept: ORTHOPEDIC SURGERY | Facility: CLINIC | Age: 59
End: 2024-05-02
Payer: OTHER MISCELLANEOUS

## 2024-05-02 VITALS — HEIGHT: 63 IN | WEIGHT: 190 LBS | BODY MASS INDEX: 33.66 KG/M2

## 2024-05-02 PROCEDURE — 99214 OFFICE O/P EST MOD 30 MIN: CPT

## 2024-05-02 NOTE — PHYSICAL EXAM
[Left] : left shoulder [Sitting] : sitting [Minimal] : minimal [] : strength is improving [TWNoteComboBox7] : active forward flexion 150 degrees [TWNoteComboBox6] : internal rotation L4

## 2024-05-02 NOTE — HISTORY OF PRESENT ILLNESS
[Work related] : work related [4] : 4 [2] : 2 [Frequent] : frequent [Household chores] : household chores [Leisure] : leisure [Sleep] : sleep [Not working due to injury] : Work status: not working due to injury [] : yes [de-identified] : WC DOI 10/30/2023: Here for f/u left shoulder s/p arthroscopy, rcr on 1/31/24. She is going to PT x2 week and doing well, still experiences some pain.  [FreeTextEntry1] : Left shoulder [FreeTextEntry3] : 10/30/23 [FreeTextEntry6] : soreness [FreeTextEntry7] : to the bicep [de-identified] : Stretching [de-identified] : Physical therapy 2 x a week, HEP

## 2024-05-02 NOTE — DISCUSSION/SUMMARY
[de-identified] : General Dx Discussion The patient was advised of the diagnosis. The natural history of the pathology was explained in full to the patient in layman's terms. All questions were answered. The risks and benefits of surgical and non-surgical treatment alternatives were explained in full to the patient.  Case Discussed. Doing well.  Continue PT. No Lifting.  No work at this time.  f/u 1 month.   Entered by Darline COLBERT acting as a scribe. Instructions: Dr. Galicia- The documentation recorded by the scribe accurately reflects the service I personally performed and the decisions made by me.

## 2024-05-30 ENCOUNTER — APPOINTMENT (OUTPATIENT)
Dept: ORTHOPEDIC SURGERY | Facility: CLINIC | Age: 59
End: 2024-05-30
Payer: OTHER MISCELLANEOUS

## 2024-05-30 ENCOUNTER — NON-APPOINTMENT (OUTPATIENT)
Age: 59
End: 2024-05-30

## 2024-05-30 VITALS — WEIGHT: 190 LBS | HEIGHT: 63 IN | BODY MASS INDEX: 33.66 KG/M2

## 2024-05-30 DIAGNOSIS — Z98.890 OTHER SPECIFIED POSTPROCEDURAL STATES: ICD-10-CM

## 2024-05-30 PROCEDURE — 99214 OFFICE O/P EST MOD 30 MIN: CPT

## 2024-05-30 NOTE — DISCUSSION/SUMMARY
[de-identified] : General Dx Discussion The patient was advised of the diagnosis. The natural history of the pathology was explained in full to the patient in layman's terms. All questions were answered. The risks and benefits of surgical and non-surgical treatment alternatives were explained in full to the patient.  Case Discussed. Doing well.  Continue PT. No Lifting.  No work at this time.  f/u 1 month.   PT has helped her she needs to cont to maximize her function

## 2024-05-30 NOTE — WORK
[Total (100%)] : total (100%) [Does not reveal pre-existing condition(s) that may affect treatment/prognosis] : does not reveal pre-existing condition(s) that may affect treatment/prognosis [Cannot return to work because ________] : cannot return to work because [unfilled] [Unknown at this time] : : unknown at this time [Patient] : patient [No Rx restrictions] : No Rx restrictions. [I provided the services listed above] :  I provided the services listed above.

## 2024-05-30 NOTE — PHYSICAL EXAM
[Left] : left shoulder [Sitting] : sitting [Minimal] : minimal [] : ROM is limited secondary to pain [TWNoteComboBox7] : active forward flexion 160 degrees [de-identified] : active abduction 110 degrees [TWNoteComboBox6] : internal rotation 60 degrees [de-identified] : external rotation 70 degrees

## 2024-05-30 NOTE — HISTORY OF PRESENT ILLNESS
[Work related] : work related [4] : 4 [2] : 2 [Frequent] : frequent [Household chores] : household chores [Leisure] : leisure [Sleep] : sleep [Not working due to injury] : Work status: not working due to injury [] : yes [de-identified] : WC DOI 10/30/2023: Here for f/u left shoulder s/p arthroscopy, rcr on 1/31/24. She is going to PT x2 week and continues to do well, still experiences some pain. She states that physical therapy is helping.  [FreeTextEntry1] : Left shoulder [FreeTextEntry3] : 10/30/23 [FreeTextEntry6] : soreness [FreeTextEntry7] : to the bicep [de-identified] : Stretching [de-identified] : Physical therapy 2 x a week, HEP

## 2024-06-27 ENCOUNTER — APPOINTMENT (OUTPATIENT)
Dept: ORTHOPEDIC SURGERY | Facility: CLINIC | Age: 59
End: 2024-06-27
Payer: OTHER MISCELLANEOUS

## 2024-06-27 VITALS — BODY MASS INDEX: 33.25 KG/M2 | HEIGHT: 63.5 IN | WEIGHT: 190 LBS

## 2024-06-27 DIAGNOSIS — M75.42 IMPINGEMENT SYNDROME OF LEFT SHOULDER: ICD-10-CM

## 2024-06-27 DIAGNOSIS — S43.432D SUPERIOR GLENOID LABRUM LESION OF LEFT SHOULDER, SUBSEQUENT ENCOUNTER: ICD-10-CM

## 2024-06-27 DIAGNOSIS — M25.512 PAIN IN LEFT SHOULDER: ICD-10-CM

## 2024-06-27 DIAGNOSIS — M75.122 COMPLETE ROTATOR CUFF TEAR OR RUPTURE OF LEFT SHOULDER, NOT SPECIFIED AS TRAUMATIC: ICD-10-CM

## 2024-06-27 PROCEDURE — 99214 OFFICE O/P EST MOD 30 MIN: CPT

## 2024-08-08 ENCOUNTER — APPOINTMENT (OUTPATIENT)
Dept: ORTHOPEDIC SURGERY | Facility: CLINIC | Age: 59
End: 2024-08-08

## 2024-08-08 PROCEDURE — 99213 OFFICE O/P EST LOW 20 MIN: CPT

## 2024-08-08 NOTE — WORK
[Partial] : partial [Does not reveal pre-existing condition(s) that may affect treatment/prognosis] : does not reveal pre-existing condition(s) that may affect treatment/prognosis [Can return to work with limitations on ______] : can return to work with limitations on [unfilled] [Pulling/Pushing] : pulling/pushing [Reaching overhead] : reaching overhead [Reaching at/below shoulder level] : reaching at or below shoulder level [Unknown at this time] : : unknown at this time [Patient] : patient [No Rx restrictions] : No Rx restrictions. [I provided the services listed above] :  I provided the services listed above.

## 2024-08-08 NOTE — DISCUSSION/SUMMARY
[de-identified] : General Dx Discussion The patient was advised of the diagnosis. The natural history of the pathology was explained in full to the patient in layman's terms. All questions were answered. The risks and benefits of surgical and non-surgical treatment alternatives were explained in full to the patient.  Case Discussed. Doing well.  Continue PT. f/u 6 weeks

## 2024-08-08 NOTE — PHYSICAL EXAM
[Left] : left shoulder [Sitting] : sitting [Minimal] : minimal [5 ___] : forward flexion 5[unfilled]/5 [5___] : internal rotation 5[unfilled]/5 [] : negative drop-arm test [TWNoteComboBox7] : active forward flexion 165 degrees [de-identified] : active abduction 130 degrees [TWNoteComboBox6] : internal rotation 65 degrees [de-identified] : external rotation 75 degrees

## 2024-08-08 NOTE — HISTORY OF PRESENT ILLNESS
[2] : 2 [1] : 2 [Sharp] : sharp [Intermittent] : intermittent [Household chores] : household chores [Leisure] : leisure [Not working due to injury] : Work status: not working due to injury [] : yes [de-identified] : WC DOI 10/30/23: Patient is here for a follow up on her left shoulder. Reports improvement since the last visit, and attributes it to physical therapy.  [FreeTextEntry7] : to the bicep [FreeTextEntry1] : Left shoulder [de-identified] : certain motions [de-identified] : Physical therapy 2 x a week, HEP

## 2024-09-19 ENCOUNTER — APPOINTMENT (OUTPATIENT)
Dept: ORTHOPEDIC SURGERY | Facility: CLINIC | Age: 59
End: 2024-09-19
Payer: OTHER MISCELLANEOUS

## 2024-09-19 VITALS — BODY MASS INDEX: 33.25 KG/M2 | HEIGHT: 63.5 IN | WEIGHT: 190 LBS

## 2024-09-19 DIAGNOSIS — Z98.890 OTHER SPECIFIED POSTPROCEDURAL STATES: ICD-10-CM

## 2024-09-19 DIAGNOSIS — M75.122 COMPLETE ROTATOR CUFF TEAR OR RUPTURE OF LEFT SHOULDER, NOT SPECIFIED AS TRAUMATIC: ICD-10-CM

## 2024-09-19 DIAGNOSIS — M75.42 IMPINGEMENT SYNDROME OF LEFT SHOULDER: ICD-10-CM

## 2024-09-19 PROCEDURE — 99214 OFFICE O/P EST MOD 30 MIN: CPT

## 2024-09-19 NOTE — PHYSICAL EXAM
[Left] : left shoulder [Sitting] : sitting [Minimal] : minimal [5 ___] : forward flexion 5[unfilled]/5 [5___] : internal rotation 5[unfilled]/5 [] : negative drop-arm test [TWNoteComboBox7] : active forward flexion 165 degrees [de-identified] : active abduction 130 degrees [TWNoteComboBox6] : internal rotation 65 degrees [de-identified] : external rotation 75 degrees

## 2024-09-19 NOTE — HISTORY OF PRESENT ILLNESS
[2] : 2 [1] : 2 [Sharp] : sharp [Intermittent] : intermittent [Household chores] : household chores [Leisure] : leisure [Not working due to injury] : Work status: not working due to injury [] : yes [de-identified] : WC DOI 10/30/23: Here for f/u left shoulder s/p arthroscopy, debridement, decompression, partial synovectomy and rcr on 1/31/24. She was going to PT and doing well.  [FreeTextEntry1] : Left shoulder [FreeTextEntry7] : to the bicep [de-identified] : certain motions [de-identified] : Physical therapy 2 x a week, HEP

## 2024-09-19 NOTE — DISCUSSION/SUMMARY
[de-identified] : General Dx Discussion The patient was advised of the diagnosis. The natural history of the pathology was explained in full to the patient in layman's terms. All questions were answered. The risks and benefits of surgical and non-surgical treatment alternatives were explained in full to the patient.  Case Discussed. Doing well.  Continue HEP.  f/u 2 months.    Entered by Darline COLBERT acting as a scribe. Instructions: Dr. Galicia- The documentation recorded by the scribe accurately reflects the service I personally performed and the decisions made by me.

## 2024-11-21 ENCOUNTER — APPOINTMENT (OUTPATIENT)
Dept: ORTHOPEDIC SURGERY | Facility: CLINIC | Age: 59
End: 2024-11-21
Payer: OTHER MISCELLANEOUS

## 2024-11-21 VITALS — WEIGHT: 190 LBS | HEIGHT: 63.5 IN | BODY MASS INDEX: 33.25 KG/M2

## 2024-11-21 DIAGNOSIS — M75.122 COMPLETE ROTATOR CUFF TEAR OR RUPTURE OF LEFT SHOULDER, NOT SPECIFIED AS TRAUMATIC: ICD-10-CM

## 2024-11-21 DIAGNOSIS — Z98.890 OTHER SPECIFIED POSTPROCEDURAL STATES: ICD-10-CM

## 2024-11-21 DIAGNOSIS — M75.42 IMPINGEMENT SYNDROME OF LEFT SHOULDER: ICD-10-CM

## 2024-11-21 PROCEDURE — 99213 OFFICE O/P EST LOW 20 MIN: CPT

## 2025-02-20 ENCOUNTER — APPOINTMENT (OUTPATIENT)
Dept: ORTHOPEDIC SURGERY | Facility: CLINIC | Age: 60
End: 2025-02-20
Payer: OTHER MISCELLANEOUS

## 2025-02-20 VITALS — BODY MASS INDEX: 33.25 KG/M2 | WEIGHT: 190 LBS | HEIGHT: 63.5 IN

## 2025-02-20 DIAGNOSIS — M17.12 UNILATERAL PRIMARY OSTEOARTHRITIS, LEFT KNEE: ICD-10-CM

## 2025-02-20 DIAGNOSIS — M25.512 PAIN IN LEFT SHOULDER: ICD-10-CM

## 2025-02-20 DIAGNOSIS — S80.02XD CONTUSION OF LEFT KNEE, SUBSEQUENT ENCOUNTER: ICD-10-CM

## 2025-02-20 DIAGNOSIS — M75.42 IMPINGEMENT SYNDROME OF LEFT SHOULDER: ICD-10-CM

## 2025-02-20 DIAGNOSIS — S80.01XD CONTUSION OF RIGHT KNEE, SUBSEQUENT ENCOUNTER: ICD-10-CM

## 2025-02-20 DIAGNOSIS — S43.432D SUPERIOR GLENOID LABRUM LESION OF LEFT SHOULDER, SUBSEQUENT ENCOUNTER: ICD-10-CM

## 2025-02-20 DIAGNOSIS — M54.50 LOW BACK PAIN, UNSPECIFIED: ICD-10-CM

## 2025-02-20 DIAGNOSIS — M75.122 COMPLETE ROTATOR CUFF TEAR OR RUPTURE OF LEFT SHOULDER, NOT SPECIFIED AS TRAUMATIC: ICD-10-CM

## 2025-02-20 PROCEDURE — 99455 WORK RELATED DISABILITY EXAM: CPT
